# Patient Record
Sex: MALE | Race: BLACK OR AFRICAN AMERICAN | Employment: OTHER | URBAN - METROPOLITAN AREA
[De-identification: names, ages, dates, MRNs, and addresses within clinical notes are randomized per-mention and may not be internally consistent; named-entity substitution may affect disease eponyms.]

---

## 2019-03-05 ENCOUNTER — HOSPITAL ENCOUNTER (OUTPATIENT)
Dept: GENERAL RADIOLOGY | Age: 79
Discharge: HOME | End: 2019-03-05
Payer: MEDICARE

## 2019-03-05 ENCOUNTER — HOSPITAL ENCOUNTER (OUTPATIENT)
Age: 79
Discharge: HOME | End: 2019-03-05
Payer: MEDICARE

## 2019-03-05 DIAGNOSIS — R05.9 COUGH: ICD-10-CM

## 2019-03-05 PROCEDURE — 71046 X-RAY EXAM CHEST 2 VIEWS: CPT

## 2023-03-04 ENCOUNTER — APPOINTMENT (OUTPATIENT)
Dept: CT IMAGING | Age: 83
End: 2023-03-04
Payer: MEDICARE

## 2023-03-04 ENCOUNTER — APPOINTMENT (OUTPATIENT)
Dept: GENERAL RADIOLOGY | Age: 83
End: 2023-03-04
Payer: MEDICARE

## 2023-03-04 ENCOUNTER — HOSPITAL ENCOUNTER (EMERGENCY)
Age: 83
Discharge: HOME | End: 2023-03-04
Attending: EMERGENCY MEDICINE
Payer: MEDICARE

## 2023-03-04 VITALS
OXYGEN SATURATION: 94 % | WEIGHT: 153.88 LBS | SYSTOLIC BLOOD PRESSURE: 127 MMHG | RESPIRATION RATE: 18 BRPM | DIASTOLIC BLOOD PRESSURE: 54 MMHG | TEMPERATURE: 98.9 F | HEART RATE: 84 BPM | HEIGHT: 72 IN | BODY MASS INDEX: 20.84 KG/M2

## 2023-03-04 DIAGNOSIS — R41.0 CONFUSION: ICD-10-CM

## 2023-03-04 DIAGNOSIS — R31.9 HEMATURIA, UNSPECIFIED TYPE: Primary | ICD-10-CM

## 2023-03-04 DIAGNOSIS — R26.89 SHUFFLING GAIT: ICD-10-CM

## 2023-03-04 DIAGNOSIS — N39.41 URGE URINARY INCONTINENCE: ICD-10-CM

## 2023-03-04 DIAGNOSIS — Z63.4 GRIEF AT LOSS OF CHILD: ICD-10-CM

## 2023-03-04 DIAGNOSIS — F43.21 GRIEF AT LOSS OF CHILD: ICD-10-CM

## 2023-03-04 LAB
A/G RATIO: 1 (ref 1.1–2.2)
ALBUMIN SERPL-MCNC: 3.6 G/DL (ref 3.4–5)
ALP BLD-CCNC: 48 U/L (ref 40–129)
ALT SERPL-CCNC: 12 U/L (ref 10–40)
ANION GAP SERPL CALCULATED.3IONS-SCNC: 13 MMOL/L (ref 3–16)
AST SERPL-CCNC: 38 U/L (ref 15–37)
BACTERIA: NORMAL /HPF
BASOPHILS ABSOLUTE: 0 K/UL (ref 0–0.2)
BASOPHILS RELATIVE PERCENT: 0.3 %
BILIRUB SERPL-MCNC: 0.6 MG/DL (ref 0–1)
BILIRUBIN URINE: NEGATIVE
BLOOD, URINE: ABNORMAL
BUN BLDV-MCNC: 26 MG/DL (ref 7–20)
CALCIUM SERPL-MCNC: 8.6 MG/DL (ref 8.3–10.6)
CHLORIDE BLD-SCNC: 104 MMOL/L (ref 99–110)
CLARITY: CLEAR
CO2: 18 MMOL/L (ref 21–32)
COLOR: YELLOW
CREAT SERPL-MCNC: 1.8 MG/DL (ref 0.8–1.3)
EOSINOPHILS ABSOLUTE: 0 K/UL (ref 0–0.6)
EOSINOPHILS RELATIVE PERCENT: 0.1 %
EPITHELIAL CELLS, UA: 0 /HPF (ref 0–5)
GFR SERPL CREATININE-BSD FRML MDRD: 37 ML/MIN/{1.73_M2}
GLUCOSE BLD-MCNC: 90 MG/DL (ref 70–99)
GLUCOSE URINE: NEGATIVE MG/DL
HCT VFR BLD CALC: 35.5 % (ref 40.5–52.5)
HEMATOLOGY PATH CONSULT: YES
HEMOGLOBIN: 11.1 G/DL (ref 13.5–17.5)
HYALINE CASTS: 1 /LPF (ref 0–8)
KETONES, URINE: ABNORMAL MG/DL
LACTIC ACID, SEPSIS: 1.7 MMOL/L (ref 0.4–1.9)
LEUKOCYTE ESTERASE, URINE: NEGATIVE
LYMPHOCYTES ABSOLUTE: 0.6 K/UL (ref 1–5.1)
LYMPHOCYTES RELATIVE PERCENT: 13 %
MCH RBC QN AUTO: 34.9 PG (ref 26–34)
MCHC RBC AUTO-ENTMCNC: 31.3 G/DL (ref 31–36)
MCV RBC AUTO: 111.4 FL (ref 80–100)
MICROSCOPIC EXAMINATION: YES
MONOCYTES ABSOLUTE: 1.1 K/UL (ref 0–1.3)
MONOCYTES RELATIVE PERCENT: 22.2 %
NEUTROPHILS ABSOLUTE: 3.2 K/UL (ref 1.7–7.7)
NEUTROPHILS RELATIVE PERCENT: 64.4 %
NITRITE, URINE: NEGATIVE
PDW BLD-RTO: 16.7 % (ref 12.4–15.4)
PH UA: 5.5 (ref 5–8)
PLATELET # BLD: 102 K/UL (ref 135–450)
PMV BLD AUTO: 7.7 FL (ref 5–10.5)
POTASSIUM SERPL-SCNC: 4.8 MMOL/L (ref 3.5–5.1)
PROTEIN UA: 30 MG/DL
RBC # BLD: 3.19 M/UL (ref 4.2–5.9)
RBC UA: 1 /HPF (ref 0–4)
SODIUM BLD-SCNC: 135 MMOL/L (ref 136–145)
SPECIFIC GRAVITY UA: 1.02 (ref 1–1.03)
TOTAL PROTEIN: 7.2 G/DL (ref 6.4–8.2)
TROPONIN: <0.01 NG/ML
URINE REFLEX TO CULTURE: ABNORMAL
URINE TYPE: ABNORMAL
UROBILINOGEN, URINE: 0.2 E.U./DL
WBC # BLD: 4.9 K/UL (ref 4–11)
WBC UA: 0 /HPF (ref 0–5)

## 2023-03-04 PROCEDURE — 93005 ELECTROCARDIOGRAM TRACING: CPT | Performed by: PHYSICIAN ASSISTANT

## 2023-03-04 PROCEDURE — 81001 URINALYSIS AUTO W/SCOPE: CPT

## 2023-03-04 PROCEDURE — 85025 COMPLETE CBC W/AUTO DIFF WBC: CPT

## 2023-03-04 PROCEDURE — 80053 COMPREHEN METABOLIC PANEL: CPT

## 2023-03-04 PROCEDURE — 99285 EMERGENCY DEPT VISIT HI MDM: CPT

## 2023-03-04 PROCEDURE — 71046 X-RAY EXAM CHEST 2 VIEWS: CPT

## 2023-03-04 PROCEDURE — 51798 US URINE CAPACITY MEASURE: CPT

## 2023-03-04 PROCEDURE — 87040 BLOOD CULTURE FOR BACTERIA: CPT

## 2023-03-04 PROCEDURE — 83605 ASSAY OF LACTIC ACID: CPT

## 2023-03-04 PROCEDURE — 84484 ASSAY OF TROPONIN QUANT: CPT

## 2023-03-04 PROCEDURE — 70450 CT HEAD/BRAIN W/O DYE: CPT

## 2023-03-04 SDOH — SOCIAL STABILITY - SOCIAL INSECURITY: DISSAPEARANCE AND DEATH OF FAMILY MEMBER: Z63.4

## 2023-03-04 NOTE — ED PROVIDER NOTES
[address] [facility]        Pt Name: Robby Tucker  MRN: [id number]  [place] 1940  Date of evaluation: 3/4/2023  Provider: SCOTT Mcclain  PCP: Shahla Crews MD  Note Started: 5:48 PM EST 3/4/23       I have seen and evaluated this patient with my supervising physician Cristopher Dumont MD.      CHIEF COMPLAINT       Chief Complaint   Patient presents with    Altered Mental Status     Family sent the patient in because they feel that he is \"off\", the patient was at his house visiting with family because he has recently found his son dead last week, and he states that he is just dealing with it, The patient denies any complaints. The patient is alert to self and place, but gets confused with time and states that he never cares about time since he retired from [employer]. Family was worried because he had an episode when he was shuffling his feet and reaching for a coat that wasn't there. HISTORY OF PRESENT ILLNESS: 1 or more Elements     History From: Patient and wife    Robby Tucker is a 81 y.o. male who presents for altered mental status and urinary incontinence. Wife gives most the history. She reports he has had urinary incontinence since yesterday which is atypical for him. Reports this morning around 5:30 AM he got up and then around 6 AM he was \"talking out of his head\" and was confused. She reports he then seemed overall weak. Gait was shuffling. Then later in the day he was going to take his dog out and was standing at the table trying to grab a coat that was not there. Denies prior episodes of this. Patient denies any dysuria or hematuria. He denies fever, chills, chest pain, shortness of breath, nausea, vomiting, abdominal pain. Patient did find his son, who was chronically ill, dead on Monday at his son's house. Patient reports he is trying to be strong for \"the young ones\".     Nursing Notes were reviewed and agreed with or any disagreements were addressed in the HPI. REVIEW OF SYSTEMS :      Review of Systems    Positives and Pertinent negatives as per HPI. SURGICAL HISTORY   History reviewed. No pertinent surgical history. CURRENTMEDICATIONS       Previous Medications    ALBUTEROL (PROVENTIL HFA;VENTOLIN HFA) 108 (90 BASE) MCG/ACT INHALER    Inhale 2 puffs into the lungs every 6 hours as needed for Wheezing    FLUTICASONE (FLOVENT HFA) 110 MCG/ACT INHALER    Inhale 1 puff into the lungs 2 times daily    FLUTICASONE-SALMETEROL (ADVAIR) 100-50 MCG/DOSE DISKUS INHALER    Inhale 1 puff into the lungs every 12 hours    MONTELUKAST (SINGULAIR) 10 MG TABLET    Take 10 mg by mouth nightly    PIOGLITAZONE (ACTOS) 15 MG TABLET    Take 15 mg by mouth daily    SIMVASTATIN (ZOCOR) 20 MG TABLET    Take 20 mg by mouth nightly       ALLERGIES     Penicillins    FAMILYHISTORY     History reviewed. No pertinent family history. SOCIAL HISTORY       Social History     Tobacco Use    Smoking status: Never    Smokeless tobacco: Never   Vaping Use    Vaping Use: Never used   Substance Use Topics    Alcohol use: No    Drug use: No       SCREENINGS        Yesica Coma Scale  Eye Opening: Spontaneous  Best Verbal Response: Oriented  Best Motor Response: Obeys commands  Berne Coma Scale Score: 15                CIWA Assessment  BP: (!) 126/52  Heart Rate: 90           PHYSICAL EXAM  1 or more Elements     ED Triage Vitals [03/04/23 1655]   BP Temp Temp Source Heart Rate Resp SpO2 Height Weight   137/63 98.9 °F (37.2 °C) Oral 99 14 98 % 6' (1.829 m) 153 lb 14.1 oz (69.8 kg)       Physical Exam  Constitutional:       General: He is not in acute distress. Appearance: Normal appearance. He is well-developed. He is not ill-appearing, toxic-appearing or diaphoretic. HENT:      Head: Normocephalic and atraumatic.    Eyes:      Conjunctiva/sclera: Conjunctivae normal.      Pupils: Pupils are equal, round, and reactive to light. Cardiovascular:      Rate and Rhythm: Normal rate and regular rhythm. Heart sounds: Normal heart sounds. Pulmonary:      Effort: Pulmonary effort is normal. No respiratory distress. Breath sounds: Normal breath sounds. Abdominal:      General: There is no distension. Palpations: Abdomen is soft. Tenderness: There is no abdominal tenderness. There is no guarding or rebound. Hernia: No hernia is present. Musculoskeletal:      Cervical back: Normal range of motion and neck supple. Skin:     General: Skin is warm. Neurological:      Mental Status: He is alert. Comments: Oriented to person and place but not time  No facial drooping   Normal speech  No dysarthria or aphasia  5/5 strength all 4 extremities  Has mild difficulty with finger to nose bilaterally   Psychiatric:         Mood and Affect: Mood normal.         Behavior: Behavior normal.         Thought Content:  Thought content normal.         Judgment: Judgment normal.           DIAGNOSTIC RESULTS   LABS:    Labs Reviewed   CBC WITH AUTO DIFFERENTIAL - Abnormal; Notable for the following components:       Result Value    RBC 3.19 (*)     Hemoglobin 11.1 (*)     Hematocrit 35.5 (*)     .4 (*)     MCH 34.9 (*)     RDW 16.7 (*)     Platelets 096 (*)     Lymphocytes Absolute 0.6 (*)     All other components within normal limits    Narrative:     Collection has been rescheduled by [facility] at 03/04/2023 17:58    COMPREHENSIVE METABOLIC PANEL - Abnormal; Notable for the following components:    Sodium 135 (*)     CO2 18 (*)     BUN 26 (*)     Creatinine 1.8 (*)     Est, Glom Filt Rate 37 (*)     Albumin/Globulin Ratio 1.0 (*)     AST 38 (*)     All other components within normal limits    Narrative:     Collection has been rescheduled by [facility] at 03/04/2023 17:58    URINALYSIS WITH REFLEX TO CULTURE - Abnormal; Notable for the following components:    Ketones, Urine TRACE (*)     Blood, Urine SMALL (*)     Protein, UA 30 (*)     All other components within normal limits   CULTURE, BLOOD 1   CULTURE, BLOOD 2   TROPONIN    Narrative:     Collection has been rescheduled by [facility] at 03/04/2023 17:58    LACTATE, SEPSIS    Narrative:     Collection has been rescheduled by [facility] at 03/04/2023 17:58    MICROSCOPIC URINALYSIS   LACTATE, SEPSIS       When ordered only abnormal lab results are displayed. All other labs were within normal range or not returned as of this dictation. EKG: When ordered, EKG's are interpreted by the Emergency Department Physician in the absence of a cardiologist.  Please see their note for interpretation of EKG. RADIOLOGY:   Non-plain film images such as CT, Ultrasound and MRI are read by the radiologist. Plain radiographic images are visualized and preliminarily interpreted by the ED Provider with the below findings:        Interpretation per the Radiologist below, if available at the time of this note:    CT HEAD WO CONTRAST   Final Result   No acute intracranial abnormality. XR CHEST (2 VW)   Final Result      Lungs are clear           No results found. PAST MEDICAL HISTORY      has a past medical history of Asthma, Diabetes mellitus ([address] Hyperlipidemia, and Hypertension. EMERGENCY DEPARTMENT COURSE and DIFFERENTIAL DIAGNOSIS/MDM:   Vitals:    Vitals:    03/04/23 1655 03/04/23 1930   BP: 137/63 (!) 126/52   Pulse: 99 90   Resp: 14 16   Temp: 98.9 °F (37.2 °C)    TempSrc: Oral    SpO2: 98% 95%   Weight: 153 lb 14.1 oz (69.8 kg)    Height: 6' (1.829 m)        Patient was given the following medications:  Medications - No data to display          Patient was nontoxic, well appearing, with normal vital signs. Saturating well on room air. Has had urinary incontinence since yesterday.   Additionally was \"talking out of his head\" today, had generalized weakness, shuffling gait, and reached for a coat that was not present prior to arrival.  On exam, He is answering questions appropriately. Still oriented to person and place but not time. Says that it is February. Will check labs, urine, CT head and CXR. Bladder scan was performed and was 19 mL post void. Differential diagnosis includes UTI, sepsis, electrolyte abnormality, pneumonia, intracranial hemorrhage, stroke, other    Labs show no leukocytosis or anemia. Metabolic panel remarkable for creatinine 1.8. This is similar to prior creatinine of 1.5 in 2018. Urinalysis does not appear infected. He is not in urinary retention. Troponin negative. Lactic acid normal.  CT head and chest x-ray negative. Upon reevaluation lying stretcher no distress. Patient was signed out to Dr. Marcus Austin. Please see his note for further ED course treatment disposition          Chronic Conditions:       I am the Primary Clinician of Record. Is this patient to be included in the SEP-1 Core Measure due to severe sepsis or septic shock? No   Exclusion criteria - the patient is NOT to be included for SEP-1 Core Measure due to: Infection is not suspected    PROCEDURES   Unless otherwise noted below, none     Procedures    FINAL IMPRESSION      1. Altered mental status, unspecified altered mental status type    2. Urinary incontinence, unspecified type          DISPOSITION/PLAN       DISPOSITION        PATIENT REFERRED TO:  No follow-up provider specified.     DISCHARGE MEDICATIONS:  New Prescriptions    No medications on file       DISCONTINUED MEDICATIONS:  Discontinued Medications    No medications on file              (Please note that portions of this note were completed with a voice recognition program.  Efforts were made to edit the dictations but occasionally words are mis-transcribed.)    SCOTT Lehman (electronically signed)            Sushant Lehman  03/04/23 4513

## 2023-03-05 LAB
BLOOD CULTURE, ROUTINE: NORMAL
EKG ATRIAL RATE: 90 BPM
EKG DIAGNOSIS: NORMAL
EKG P AXIS: 82 DEGREES
EKG P-R INTERVAL: 190 MS
EKG Q-T INTERVAL: 362 MS
EKG QRS DURATION: 80 MS
EKG QTC CALCULATION (BAZETT): 442 MS
EKG R AXIS: 87 DEGREES
EKG T AXIS: 63 DEGREES
EKG VENTRICULAR RATE: 90 BPM

## 2023-03-05 PROCEDURE — 93010 ELECTROCARDIOGRAM REPORT: CPT | Performed by: INTERNAL MEDICINE

## 2023-03-05 NOTE — ED PROVIDER NOTES
I independently performed a history and physical on Pravin Duron.   All diagnostic, treatment, and disposition decisions were made by myself in conjunction with the advanced practice provider.     For further details of Pravin Duron's emergency department encounter, please see SCOTT Pollard's documentation.    Patient is here because of shuffling gait and some urinary incontinence which is unusual for him.  Patient reports he was the one to find his  son 5 days ago and is grieving as result of this.  He reports that his urinary incontinence episodes have been urge incontinence where he is not able to make it to the bathroom in time due to his slow gait.  He denies any falls.  On exam patient is awake and alert and oriented.  He was able to walk without assistance in the hallway but is requesting to have a walker for home use.  He states he feels well and would like to be discharged.    EKG  The Ekg interpreted by me shows  normal sinus rhythm with a rate of 90  Axis is   Normal  QTc is  normal  Intervals and Durations are unremarkable.      ST Segments: no acute change  No significant change from prior EKG dated 2015      Labs Reviewed   CBC WITH AUTO DIFFERENTIAL - Abnormal; Notable for the following components:       Result Value    RBC 3.19 (*)     Hemoglobin 11.1 (*)     Hematocrit 35.5 (*)     .4 (*)     MCH 34.9 (*)     RDW 16.7 (*)     Platelets 102 (*)     Lymphocytes Absolute 0.6 (*)     All other components within normal limits    Narrative:     Collection has been rescheduled by [facility] at 2023 17:58    COMPREHENSIVE METABOLIC PANEL - Abnormal; Notable for the following components:    Sodium 135 (*)     CO2 18 (*)     BUN 26 (*)     Creatinine 1.8 (*)     Est, Glom Filt Rate 37 (*)     Albumin/Globulin Ratio 1.0 (*)     AST 38 (*)     All other components within normal limits    Narrative:     Collection has been rescheduled by [facility] at 2023 17:58   URINALYSIS WITH REFLEX TO CULTURE - Abnormal; Notable for the following components:    Ketones, Urine TRACE (*)     Blood, Urine SMALL (*)     Protein, UA 30 (*)     All other components within normal limits   CULTURE, BLOOD 1   CULTURE, BLOOD 2   TROPONIN    Narrative:     Collection has been rescheduled by [facility] at 03/04/2023 17:58    LACTATE, SEPSIS    Narrative:     Collection has been rescheduled by [facility] at 03/04/2023 17:58    MICROSCOPIC URINALYSIS   LACTATE, SEPSIS     CT HEAD WO CONTRAST   Final Result   No acute intracranial abnormality. XR CHEST (2 VW)   Final Result      Lungs are clear           I personally saw this patient and performed a substantive portion of the visit including all aspects of the medical decision making. MDM  In my opinion the patient has some mild shuffling gait and is grieving significantly. I cannot find any medical cause for his symptoms and he definitely does not have a stroke in my opinion. I believe he is safe for discharge.     Results for orders placed or performed during the hospital encounter of 03/04/23   CBC with Auto Differential   Result Value Ref Range    WBC 4.9 4.0 - 11.0 K/uL    RBC 3.19 (L) 4.20 - 5.90 M/uL    Hemoglobin 11.1 (L) 13.5 - 17.5 g/dL    Hematocrit 35.5 (L) 40.5 - 52.5 %    .4 (H) 80.0 - 100.0 fL    MCH 34.9 (H) 26.0 - 34.0 pg    MCHC 31.3 31.0 - 36.0 g/dL    RDW 16.7 (H) 12.4 - 15.4 %    Platelets 742 (L) 488 - 450 K/uL    MPV 7.7 5.0 - 10.5 fL    Path Consult Yes     Neutrophils % 64.4 %    Lymphocytes % 13.0 %    Monocytes % 22.2 %    Eosinophils % 0.1 %    Basophils % 0.3 %    Neutrophils Absolute 3.2 1.7 - 7.7 K/uL    Lymphocytes Absolute 0.6 (L) 1.0 - 5.1 K/uL    Monocytes Absolute 1.1 0.0 - 1.3 K/uL    Eosinophils Absolute 0.0 0.0 - 0.6 K/uL    Basophils Absolute 0.0 0.0 - 0.2 K/uL   Comprehensive Metabolic Panel   Result Value Ref Range    Sodium 135 (L) 136 - 145 mmol/L    Potassium 4.8 3.5 - 5.1 mmol/L    Chloride 104 99 - 110 mmol/L    CO2 18 (L) 21 - 32 mmol/L    Anion Gap 13 3 - 16    Glucose 90 70 - 99 mg/dL    BUN 26 (H) 7 - 20 mg/dL    Creatinine 1.8 (H) 0.8 - 1.3 mg/dL    Est, Glom Filt Rate 37 (A) >60    Calcium 8.6 8.3 - 10.6 mg/dL    Total Protein 7.2 6.4 - 8.2 g/dL    Albumin 3.6 3.4 - 5.0 g/dL    Albumin/Globulin Ratio 1.0 (L) 1.1 - 2.2    Total Bilirubin 0.6 0.0 - 1.0 mg/dL    Alkaline Phosphatase 48 40 - 129 U/L    ALT 12 10 - 40 U/L    AST 38 (H) 15 - 37 U/L   Troponin   Result Value Ref Range    Troponin <0.01 <0.01 ng/mL   Lactate, Sepsis   Result Value Ref Range    Lactic Acid, Sepsis 1.7 0.4 - 1.9 mmol/L   Urinalysis with Reflex to Culture    Specimen: Urine, clean catch   Result Value Ref Range    Color, UA Yellow Straw/Yellow    Clarity, UA Clear Clear    Glucose, Ur Negative Negative mg/dL    Bilirubin Urine Negative Negative    Ketones, Urine TRACE (A) Negative mg/dL    Specific Gravity, UA 1.019 1.005 - 1.030    Blood, Urine SMALL (A) Negative    pH, UA 5.5 5.0 - 8.0    Protein, UA 30 (A) Negative mg/dL    Urobilinogen, Urine 0.2 <2.0 E.U./dL    Nitrite, Urine Negative Negative    Leukocyte Esterase, Urine Negative Negative    Microscopic Examination YES     Urine Type NotGiven     Urine Reflex to Culture Not Indicated    Microscopic Urinalysis   Result Value Ref Range    Bacteria, UA None Seen None Seen /HPF    Hyaline Casts, UA 1 0 - 8 /LPF    WBC, UA 0 0 - 5 /HPF    RBC, UA 1 0 - 4 /HPF    Epithelial Cells, UA 0 0 - 5 /HPF       I estimate there is LOW risk for ACUTE CORONARY SYNDROME, INTRACRANIAL HEMORRHAGE, MALIGNANT DYSRHYTHMIA, MENINGITIS, PNEUMONIA, PULMONARY EMBOLISM, SEPSIS, SUBARACHNOID HEMORRHAGE, SUBDURAL HEMATOMA, STROKE, or URINARY TRACT INFECTION, thus I consider the discharge disposition reasonable. Kamala Cota and I have discussed the diagnosis and risks, and we agree with discharging home to follow-up with their primary doctor.  We also discussed returning to the Emergency Department immediately if new or worsening symptoms occur. We have discussed the symptoms which are most concerning (e.g., changing or worsening pain, weakness, vomiting, fever) that necessitate immediate return. Final Impression    1. Hematuria, unspecified type    2. Urge urinary incontinence    3. Confusion    4. Grief at loss of child    5. Shuffling gait        Blood pressure (!) 127/54, pulse 84, temperature 98.9 °F (37.2 °C), temperature source Oral, resp. rate 18, height 6' (1.829 m), weight 153 lb 14.1 oz (69.8 kg), SpO2 94 %.        Carlos Michael MD  03/05/23 8802 Alert-The patient is alert, awake and responds to voice. The patient is oriented to time, place, and person. The triage nurse is able to obtain subjective information.

## 2023-03-05 NOTE — ED NOTES
.Pt discharged at this time. Discharge instructions and medications reviewed,  Questions were answered. PT verbalized understanding. .  Follow up appointments were discussed.          [facility]  03/04/23 5517

## 2023-03-06 LAB — HEMATOLOGY PATH CONSULT: NORMAL

## 2023-03-07 NOTE — RESULT ENCOUNTER NOTE
Patient's positive result has been appropriately evaluated by the provider pool. Patient was contacted and notified of the results and to follow up with Dr. Tobin Martin.         Pharmacy:   [facility] [address] [place] [address], [address] [place] [address] [address] [place] [address]  Phone: [phone] Fax: [phone]    Phone number:   Pharmacist receiving the prescription:

## 2023-03-08 LAB — BLOOD CULTURE, ROUTINE: NORMAL
